# Patient Record
Sex: FEMALE | Race: WHITE | Employment: OTHER | URBAN - METROPOLITAN AREA
[De-identification: names, ages, dates, MRNs, and addresses within clinical notes are randomized per-mention and may not be internally consistent; named-entity substitution may affect disease eponyms.]

---

## 2017-01-20 ENCOUNTER — OFFICE VISIT (OUTPATIENT)
Dept: HEMATOLOGY/ONCOLOGY | Facility: HOSPITAL | Age: 82
End: 2017-01-20
Attending: INTERNAL MEDICINE
Payer: MEDICARE

## 2017-01-20 VITALS
DIASTOLIC BLOOD PRESSURE: 80 MMHG | HEIGHT: 58.25 IN | RESPIRATION RATE: 18 BRPM | BODY MASS INDEX: 26.01 KG/M2 | TEMPERATURE: 98 F | SYSTOLIC BLOOD PRESSURE: 160 MMHG | WEIGHT: 125.63 LBS | HEART RATE: 56 BPM

## 2017-01-20 DIAGNOSIS — M89.9 BONE LESION: Primary | ICD-10-CM

## 2017-01-20 PROCEDURE — 99205 OFFICE O/P NEW HI 60 MIN: CPT | Performed by: INTERNAL MEDICINE

## 2017-01-20 PROCEDURE — G0463 HOSPITAL OUTPT CLINIC VISIT: HCPCS | Performed by: INTERNAL MEDICINE

## 2017-01-20 RX ORDER — SERTRALINE HYDROCHLORIDE 25 MG/1
TABLET, FILM COATED ORAL
Refills: 1 | COMMUNITY
Start: 2017-01-17

## 2017-01-20 RX ORDER — ATORVASTATIN CALCIUM 10 MG/1
TABLET, FILM COATED ORAL
Refills: 1 | COMMUNITY
Start: 2017-01-15

## 2017-01-20 RX ORDER — SUCRALFATE 1 G/1
TABLET ORAL
Refills: 0 | COMMUNITY
Start: 2016-12-07

## 2017-01-20 RX ORDER — CLOPIDOGREL BISULFATE 75 MG/1
TABLET ORAL
Refills: 3 | COMMUNITY
Start: 2016-11-02

## 2017-01-20 RX ORDER — LISINOPRIL 20 MG/1
TABLET ORAL
Refills: 3 | COMMUNITY
Start: 2017-01-17

## 2017-01-20 NOTE — PROGRESS NOTES
HPI     Ms. 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 Genitourinary: Negative for dysuria, urgency, hematuria and flank pain. Musculoskeletal: Positive for back pain (lower back and pelvic pain. Upper back pain at the end of the day.), arthralgias and gait problem (needs support. ).  Patricia Cage cigarettes    Alcohol Use: Yes  0.0 oz/week    0 Standard drinks or equivalent per week         Comment: Wine with dinner, vodka 3 times a week    Drug Use: No    Sexual Activity: Not on file   Not on file  Other Topics Concern    Caffeine Concern Yes    C noted. No erythema. Psychiatric: She has a normal mood and affect. Her behavior is normal. Judgment normal.           ASSESSMENT/PLAN:   Bone lesion  (primary encounter diagnosis)     In summary,Ms. Lavern Mcallister is a pleasant 17-year-old woman who was admitt

## 2017-01-25 ENCOUNTER — HOSPITAL ENCOUNTER (OUTPATIENT)
Dept: GENERAL RADIOLOGY | Facility: HOSPITAL | Age: 82
Discharge: HOME | End: 2017-01-25
Attending: INTERNAL MEDICINE
Payer: MEDICARE

## 2017-01-25 ENCOUNTER — LAB ENCOUNTER (OUTPATIENT)
Dept: LAB | Facility: HOSPITAL | Age: 82
End: 2017-01-25
Attending: INTERNAL MEDICINE
Payer: MEDICARE

## 2017-01-25 DIAGNOSIS — M89.9 BONE LESION: ICD-10-CM

## 2017-01-25 LAB
ALBUMIN SERPL BCP-MCNC: 4 G/DL (ref 3.5–4.8)
ALBUMIN/GLOB SERPL: 1.7 {RATIO} (ref 1–2)
ALP SERPL-CCNC: 60 U/L (ref 32–100)
ALT SERPL-CCNC: 12 U/L (ref 14–54)
ANION GAP SERPL CALC-SCNC: 9 MMOL/L (ref 0–18)
AST SERPL-CCNC: 20 U/L (ref 15–41)
BASOPHILS # BLD: 0 K/UL (ref 0–0.2)
BASOPHILS NFR BLD: 1 %
BILIRUB SERPL-MCNC: 0.8 MG/DL (ref 0.3–1.2)
BUN SERPL-MCNC: 10 MG/DL (ref 8–20)
BUN/CREAT SERPL: 13.7 (ref 10–20)
CALCIUM SERPL-MCNC: 8.9 MG/DL (ref 8.5–10.5)
CHLORIDE SERPL-SCNC: 106 MMOL/L (ref 95–110)
CO2 SERPL-SCNC: 28 MMOL/L (ref 22–32)
CREAT SERPL-MCNC: 0.73 MG/DL (ref 0.5–1.5)
EOSINOPHIL # BLD: 0.1 K/UL (ref 0–0.7)
EOSINOPHIL NFR BLD: 1 %
ERYTHROCYTE [DISTWIDTH] IN BLOOD BY AUTOMATED COUNT: 13.5 % (ref 11–15)
GLOBULIN PLAS-MCNC: 2.4 G/DL (ref 2.5–3.7)
GLUCOSE SERPL-MCNC: 81 MG/DL (ref 70–99)
HCT VFR BLD AUTO: 38.4 % (ref 35–48)
HGB BLD-MCNC: 12.7 G/DL (ref 12–16)
IGA SERPL-MCNC: <61 MG/DL (ref 68–378)
IGM SERPL-MCNC: 53 MG/DL (ref 60–263)
IMMUNOGLOBULIN PNL SER-MCNC: 520 MG/DL (ref 694–1618)
LYMPHOCYTES # BLD: 1.4 K/UL (ref 1–4)
LYMPHOCYTES NFR BLD: 26 %
MCH RBC QN AUTO: 31.3 PG (ref 27–32)
MCHC RBC AUTO-ENTMCNC: 33.1 G/DL (ref 32–37)
MCV RBC AUTO: 94.7 FL (ref 80–100)
MONOCYTES # BLD: 0.4 K/UL (ref 0–1)
MONOCYTES NFR BLD: 8 %
NEUTROPHILS # BLD AUTO: 3.4 K/UL (ref 1.8–7.7)
NEUTROPHILS NFR BLD: 64 %
OSMOLALITY UR CALC.SUM OF ELEC: 294 MOSM/KG (ref 275–295)
PLATELET # BLD AUTO: 179 K/UL (ref 140–400)
PMV BLD AUTO: 9.9 FL (ref 7.4–10.3)
POTASSIUM SERPL-SCNC: 4.6 MMOL/L (ref 3.3–5.1)
PROT SERPL-MCNC: 6.4 G/DL (ref 5.9–8.4)
RBC # BLD AUTO: 4.06 M/UL (ref 3.7–5.4)
SODIUM SERPL-SCNC: 143 MMOL/L (ref 136–144)
WBC # BLD AUTO: 5.3 K/UL (ref 4–11)

## 2017-01-25 PROCEDURE — 82784 ASSAY IGA/IGD/IGG/IGM EACH: CPT

## 2017-01-25 PROCEDURE — 83883 ASSAY NEPHELOMETRY NOT SPEC: CPT

## 2017-01-25 PROCEDURE — 80053 COMPREHEN METABOLIC PANEL: CPT

## 2017-01-25 PROCEDURE — 85025 COMPLETE CBC W/AUTO DIFF WBC: CPT

## 2017-01-25 PROCEDURE — 86334 IMMUNOFIX E-PHORESIS SERUM: CPT

## 2017-01-25 PROCEDURE — 84165 PROTEIN E-PHORESIS SERUM: CPT

## 2017-01-25 PROCEDURE — 36415 COLL VENOUS BLD VENIPUNCTURE: CPT

## 2017-01-25 PROCEDURE — 77075 RADEX OSSEOUS SURVEY COMPL: CPT

## 2017-01-27 LAB
ALBUMIN SERPL ELPH-MCNC: 4.5 G/DL (ref 3.8–5.8)
ALBUMIN/GLOB SERPL: 2.25 {RATIO} (ref 1–2)
ALPHA1 GLOB SERPL ELPH-MCNC: 0.19 G/DL (ref 0.1–0.3)
ALPHA2 GLOB SERPL ELPH-MCNC: 0.73 G/DL (ref 0.6–1)
B-GLOBULIN SERPL ELPH-MCNC: 0.66 G/DL (ref 0.7–1.3)
GAMMA GLOB SERPL ELPH-MCNC: 0.42 G/DL (ref 0.5–1.7)
KAPPA FREE LIGHT CHAIN: 1.17 MG/DL (ref 0.33–1.94)
KAPPA/LAMBDA FLC RATIO: 0.03 (ref 0.26–1.65)
LAMBDA FREE LIGHT CHAIN: 44.48 MG/DL (ref 0.57–2.63)
TOTAL PROTEIN (SPECIAL TESTING): 6.5 G/DL (ref 6.5–9.1)

## 2017-01-28 ENCOUNTER — LAB ENCOUNTER (OUTPATIENT)
Dept: LAB | Facility: HOSPITAL | Age: 82
End: 2017-01-28
Attending: INTERNAL MEDICINE
Payer: MEDICARE

## 2017-01-28 DIAGNOSIS — M89.9 BONE LESION: ICD-10-CM

## 2017-01-28 LAB
PROT 24H UR-MRATE: 58 MG/24H (ref 0–150)
SPECIMEN VOL 24H UR: 580 ML/24H

## 2017-01-28 PROCEDURE — 84166 PROTEIN E-PHORESIS/URINE/CSF: CPT

## 2017-01-28 PROCEDURE — 84156 ASSAY OF PROTEIN URINE: CPT

## 2017-01-28 PROCEDURE — 86335 IMMUNFIX E-PHORSIS/URINE/CSF: CPT

## 2017-01-30 ENCOUNTER — TELEPHONE (OUTPATIENT)
Dept: HEMATOLOGY/ONCOLOGY | Facility: HOSPITAL | Age: 82
End: 2017-01-30

## 2017-01-30 DIAGNOSIS — D47.2 MONOCLONAL GAMMOPATHY: Primary | ICD-10-CM

## 2017-01-30 NOTE — TELEPHONE ENCOUNTER
Called Teresa Prince to discuss labs and Xray results. Explained SPEP/ALFONSO shows monoclonal lambda light chain gammopathy. Bone survey negative for lytic lesions. Will need MRI skull/spine/pelvis myeloma protocol for evaluation. Pt has allergy to iodine.  Will pre

## 2017-02-03 ENCOUNTER — HOSPITAL ENCOUNTER (OUTPATIENT)
Dept: MRI IMAGING | Facility: HOSPITAL | Age: 82
Discharge: HOME | End: 2017-02-03
Attending: INTERNAL MEDICINE
Payer: MEDICARE

## 2017-02-03 ENCOUNTER — APPOINTMENT (OUTPATIENT)
Dept: HEMATOLOGY/ONCOLOGY | Facility: HOSPITAL | Age: 82
End: 2017-02-03
Attending: INTERNAL MEDICINE
Payer: MEDICARE

## 2017-02-03 DIAGNOSIS — D47.2 MONOCLONAL GAMMOPATHY: ICD-10-CM

## 2017-02-03 PROCEDURE — 72157 MRI CHEST SPINE W/O & W/DYE: CPT

## 2017-02-03 PROCEDURE — 72158 MRI LUMBAR SPINE W/O & W/DYE: CPT

## 2017-02-03 PROCEDURE — 72197 MRI PELVIS W/O & W/DYE: CPT

## 2017-02-03 PROCEDURE — 72156 MRI NECK SPINE W/O & W/DYE: CPT

## 2017-02-03 PROCEDURE — A9575 INJ GADOTERATE MEGLUMI 0.1ML: HCPCS | Performed by: INTERNAL MEDICINE

## 2017-02-03 PROCEDURE — 70553 MRI BRAIN STEM W/O & W/DYE: CPT

## 2017-02-10 ENCOUNTER — OFFICE VISIT (OUTPATIENT)
Dept: HEMATOLOGY/ONCOLOGY | Facility: HOSPITAL | Age: 82
End: 2017-02-10
Attending: INTERNAL MEDICINE
Payer: MEDICARE

## 2017-02-10 VITALS
DIASTOLIC BLOOD PRESSURE: 102 MMHG | BODY MASS INDEX: 25.68 KG/M2 | RESPIRATION RATE: 18 BRPM | HEIGHT: 58.25 IN | TEMPERATURE: 98 F | SYSTOLIC BLOOD PRESSURE: 178 MMHG | HEART RATE: 60 BPM | WEIGHT: 124 LBS

## 2017-02-10 DIAGNOSIS — M89.9 BONE LESION: Primary | ICD-10-CM

## 2017-02-10 DIAGNOSIS — K76.9 LIVER LESION: ICD-10-CM

## 2017-02-10 DIAGNOSIS — D47.2 MGUS (MONOCLONAL GAMMOPATHY OF UNKNOWN SIGNIFICANCE): ICD-10-CM

## 2017-02-10 PROCEDURE — G0463 HOSPITAL OUTPT CLINIC VISIT: HCPCS | Performed by: INTERNAL MEDICINE

## 2017-02-10 PROCEDURE — 99214 OFFICE O/P EST MOD 30 MIN: CPT | Performed by: INTERNAL MEDICINE

## 2017-02-10 NOTE — PROGRESS NOTES
HPI     28-year-old woman who was admitted in early December for complaints of abdominal pain and lightheadedness.  Patient underwent CT of the abdomen pelvis noncontrast which revealed possible multiple lucent foci of the osseous structures(pelvis and pr 25 MG Oral Tab  Disp:  Rfl: 1   sucralfate 1 G Oral Tab  Disp:  Rfl: 0     Allergies:     Ibuprofen               Rash    Comment:Throat closes  Iodine (Topical)        Rash  Nsaids                      Past Medical History   Diagnosis Date   • PAD (periph sounds. Exam reveals no friction rub. No murmur heard. Pulmonary/Chest: Effort normal and breath sounds normal. No respiratory distress. She exhibits no tenderness. Abdominal: Soft. Bowel sounds are normal. She exhibits no distension and no mass.  Thad Perez [E]  IMMUNOGLOBULIN A/G/M, QUANT [E]  IMMUNOGLOBULIN FREE LT CHAINS BLOOD [E]  SERUM PROTEIN ELECTROPHORESIS [E]  IMMUNOFIXATION (ALFONSO) [E]    Results From Past 48 Hours:  No results found for this or any previous visit (from the past 50 hour(s)).        Res

## 2017-05-09 ENCOUNTER — TELEPHONE (OUTPATIENT)
Dept: HEMATOLOGY/ONCOLOGY | Facility: HOSPITAL | Age: 82
End: 2017-05-09

## 2017-05-31 ENCOUNTER — TELEPHONE (OUTPATIENT)
Dept: HEMATOLOGY/ONCOLOGY | Facility: HOSPITAL | Age: 82
End: 2017-05-31

## 2017-06-13 ENCOUNTER — TELEPHONE (OUTPATIENT)
Dept: HEMATOLOGY/ONCOLOGY | Facility: HOSPITAL | Age: 82
End: 2017-06-13

## 2017-06-26 ENCOUNTER — TELEPHONE (OUTPATIENT)
Dept: HEMATOLOGY/ONCOLOGY | Facility: HOSPITAL | Age: 82
End: 2017-06-26

## 2017-07-12 ENCOUNTER — TELEPHONE (OUTPATIENT)
Dept: HEMATOLOGY/ONCOLOGY | Facility: HOSPITAL | Age: 82
End: 2017-07-12

## 2017-08-01 ENCOUNTER — TELEPHONE (OUTPATIENT)
Dept: HEMATOLOGY/ONCOLOGY | Facility: HOSPITAL | Age: 82
End: 2017-08-01

## 2017-08-11 ENCOUNTER — APPOINTMENT (OUTPATIENT)
Dept: HEMATOLOGY/ONCOLOGY | Facility: HOSPITAL | Age: 82
End: 2017-08-11
Attending: INTERNAL MEDICINE
Payer: MEDICARE

## 2018-08-24 PROCEDURE — 83883 ASSAY NEPHELOMETRY NOT SPEC: CPT | Performed by: INTERNAL MEDICINE

## 2018-08-24 PROCEDURE — 86334 IMMUNOFIX E-PHORESIS SERUM: CPT | Performed by: INTERNAL MEDICINE

## 2018-08-24 PROCEDURE — 84165 PROTEIN E-PHORESIS SERUM: CPT | Performed by: INTERNAL MEDICINE

## (undated) NOTE — MR AVS SNAPSHOT
Jose Howard   2017 10:00 AM   Office Visit   MRN:  [id number]    Description:  Female : 1929   Provider:  Jason Garcia   Department:  [facility] Hematology Oncology              Visit Summary      Primary Visit Diagnosis Friday January 20, 2017     Imaging:  XR BONE SURVEY, COMPLETE (CPT=77075)             [employer]     Sign up for [employer], your secure online medical record.   [employer] will allow you to access patient instructions from your recent visit,  view other health in

## (undated) NOTE — MR AVS SNAPSHOT
Jeanette Purdy   2/10/2017 2:00 PM   Office Visit   MRN:  [id number]    Description:  Female : 1929   Provider:  Larry Stone   Department:  [facility] Hematology Oncology              Visit Summary      Primary Visit Diagnosis [employer]     Sign up for [employer], your secure online medical record. [employer] will allow you to access patient instructions from your recent visit,  view other health information, and more. To sign up or find more information, go to [email or web address]